# Patient Record
Sex: MALE | Race: WHITE
[De-identification: names, ages, dates, MRNs, and addresses within clinical notes are randomized per-mention and may not be internally consistent; named-entity substitution may affect disease eponyms.]

---

## 2017-10-16 ENCOUNTER — RESULT REVIEW (OUTPATIENT)
Age: 52
End: 2017-10-16

## 2021-11-03 ENCOUNTER — APPOINTMENT (OUTPATIENT)
Dept: OTOLARYNGOLOGY | Facility: CLINIC | Age: 56
End: 2021-11-03
Payer: COMMERCIAL

## 2021-11-03 VITALS
DIASTOLIC BLOOD PRESSURE: 58 MMHG | HEIGHT: 66 IN | SYSTOLIC BLOOD PRESSURE: 116 MMHG | BODY MASS INDEX: 26.52 KG/M2 | HEART RATE: 52 BPM | WEIGHT: 165 LBS

## 2021-11-03 DIAGNOSIS — Z78.9 OTHER SPECIFIED HEALTH STATUS: ICD-10-CM

## 2021-11-03 PROCEDURE — 99204 OFFICE O/P NEW MOD 45 MIN: CPT

## 2021-11-03 RX ORDER — CIPROFLOXACIN 0.5 MG/.25ML
0.2 SOLUTION/ DROPS AURICULAR (OTIC)
Refills: 0 | Status: DISCONTINUED | COMMUNITY

## 2021-11-03 RX ORDER — AMOXICILLIN 875 MG/1
875 TABLET, FILM COATED ORAL
Refills: 0 | Status: DISCONTINUED | COMMUNITY

## 2021-11-03 NOTE — HISTORY OF PRESENT ILLNESS
[de-identified] : Andres Gillette is a 55 yo male who presents for evaluation of ear infection. He states that he began have left otalgia, aural fullness and ringing on 10/28. He went to urgent care and was prescribed amoxicillin and ciprofloxacin drops. He states that the pain improved but the fullness and ringing improved. Overnight on 11/1, while digitally manipulating his ear canal, he noted some bleeding. He denies any preceding otorrhea, vertigo, or facial weakness. He notes that the tinnitus is a constant buzzing, left-sided, nonpulsatile. He denies nasal congestion, rhinorrhea, or postnasal drainage. He denies fevers or chills.

## 2021-11-03 NOTE — REASON FOR VISIT
[Tinnitus] : tinnitus [Initial Evaluation] : an initial evaluation for [FreeTextEntry2] : ear blockage

## 2021-11-03 NOTE — PHYSICAL EXAM
[de-identified] : Bilateral external ears normal appearing. Right EAC wnl. Left EAC with significant circumferential inflammation and edema. Otowick placed. [de-identified] : Right TM intact with no effusion or perforation. Unable to visualize left TM. [Midline] : trachea located in midline position [Normal] : no rashes

## 2021-11-03 NOTE — REVIEW OF SYSTEMS
[Ear Drainage] : ear drainage [Ear Noises] : ear noises [Nose Bleeds] : nose bleeds [Problem Snoring] : problem snoring [Sinus Pain] : sinus pain [Sinus Pressure] : sinus pressure [Throat Dryness] : throat dryness [Throat Itching] : throat itching [Negative] : Heme/Lymph [de-identified] : strawberry birthmark [de-identified] : headache [FreeTextEntry1] : sweating at night

## 2021-11-03 NOTE — ASSESSMENT
[FreeTextEntry1] : Andres Gillette presents with significant left acute otitis externa. Wick was placed today and drops applied. His previous drops did not have a steroid component. Will start ciprodex drops.\par \par - Ciprodex drops - 5 drops twice daily to left ear.\par - Follow up in 5 days for wick removal.

## 2021-11-08 ENCOUNTER — APPOINTMENT (OUTPATIENT)
Dept: OTOLARYNGOLOGY | Facility: CLINIC | Age: 56
End: 2021-11-08
Payer: COMMERCIAL

## 2021-11-08 VITALS
DIASTOLIC BLOOD PRESSURE: 71 MMHG | SYSTOLIC BLOOD PRESSURE: 111 MMHG | HEART RATE: 57 BPM | HEIGHT: 66 IN | BODY MASS INDEX: 26.52 KG/M2 | WEIGHT: 165 LBS

## 2021-11-08 PROCEDURE — 99213 OFFICE O/P EST LOW 20 MIN: CPT

## 2021-11-08 NOTE — PHYSICAL EXAM
[de-identified] : Bilateral external ears normal appearing. Right EAC wnl. Left otowick removed. Improved edema and erythema of EAC. [de-identified] : Right TM intact with no effusion or perforation. Partial visualization of left TM, appears intact with no effusion. [Midline] : trachea located in midline position [Normal] : no rashes

## 2021-11-08 NOTE — ASSESSMENT
[FreeTextEntry1] : Mr. Gillette presents for follow-up of left otitis externa. Otowick removed today. Left external auditory canal erythema and edema improved, but still somewhat present. \par \par - Continue ciprodex drops - 5 drops to left ear BID for an additional 9 days, making it a total of 14 days.\par - Follow up in 2 weeks.

## 2021-11-08 NOTE — HISTORY OF PRESENT ILLNESS
[de-identified] : Andres Gillette is a 55 yo male who presents for evaluation of ear infection. He states that he began have left otalgia, aural fullness and ringing on 10/28. He went to urgent care and was prescribed amoxicillin and ciprofloxacin drops. He states that the pain improved but the fullness and ringing improved. Overnight on 11/1, while digitally manipulating his ear canal, he noted some bleeding. He denies any preceding otorrhea, vertigo, or facial weakness. He notes that the tinnitus is a constant buzzing, left-sided, nonpulsatile. He denies nasal congestion, rhinorrhea, or postnasal drainage. He denies fevers or chills. [FreeTextEntry1] : 11/8/21 - Mr. Gillette presents for follow-up and ear wick removal. He states that the pain has resolved but he continues to have left tinnitus. He denies left otorrhea, vertigo, fevers, or chills. He denies facial weakness or numbness.[id number]

## 2021-11-22 ENCOUNTER — APPOINTMENT (OUTPATIENT)
Dept: OTOLARYNGOLOGY | Facility: CLINIC | Age: 56
End: 2021-11-22
Payer: COMMERCIAL

## 2021-11-22 VITALS
HEIGHT: 66 IN | WEIGHT: 165 LBS | SYSTOLIC BLOOD PRESSURE: 112 MMHG | BODY MASS INDEX: 26.52 KG/M2 | DIASTOLIC BLOOD PRESSURE: 68 MMHG | HEART RATE: 56 BPM

## 2021-11-22 PROCEDURE — 99213 OFFICE O/P EST LOW 20 MIN: CPT

## 2021-11-22 NOTE — HISTORY OF PRESENT ILLNESS
[de-identified] : Andres Gillette is a 57 yo male who presents for evaluation of ear infection. He states that he began have left otalgia, aural fullness and ringing on 10/28. He went to urgent care and was prescribed amoxicillin and ciprofloxacin drops. He states that the pain improved but the fullness and ringing improved. Overnight on 11/1, while digitally manipulating his ear canal, he noted some bleeding. He denies any preceding otorrhea, vertigo, or facial weakness. He notes that the tinnitus is a constant buzzing, left-sided, nonpulsatile. He denies nasal congestion, rhinorrhea, or postnasal drainage. He denies fevers or chills. [FreeTextEntry1] : 11/8/21 - Mr. Gillette presents for follow-up and ear wick removal. He states that the pain has resolved but he continues to have left tinnitus. He denies left otorrhea, vertigo, fevers, or chills. He denies facial weakness or numbness.\par \par 11/22/21 - Patient presents for follow-up. He states that his otalgia has resolved. He denies hearing change and notes that tinnitus has resolved. He denies otorrhea, vertigo, fevers, chills.

## 2021-11-22 NOTE — PHYSICAL EXAM
[de-identified] : Bilateral external ears normal appearing. Right EAC wnl. Resolved edema and erythema of left EAC [Midline] : trachea located in midline position [Normal] : no rashes

## 2021-11-22 NOTE — ASSESSMENT
[FreeTextEntry1] : Andres Gillette presents for follow-up of his left acute otitis externa. He has completed his antibiotic/steroid drop course and his symptoms have resolved. His infection has resolved on exam. His tinnitus is gone and hearing is at baseline so will defer audiogram today.\par \par - Follow up as needed.

## 2022-06-06 ENCOUNTER — FORM ENCOUNTER (OUTPATIENT)
Age: 57
End: 2022-06-06

## 2022-07-14 ENCOUNTER — FORM ENCOUNTER (OUTPATIENT)
Age: 57
End: 2022-07-14

## 2022-07-15 ENCOUNTER — NON-APPOINTMENT (OUTPATIENT)
Age: 57
End: 2022-07-15

## 2022-07-20 ENCOUNTER — NON-APPOINTMENT (OUTPATIENT)
Age: 57
End: 2022-07-20

## 2022-11-07 ENCOUNTER — OFFICE (OUTPATIENT)
Dept: URBAN - METROPOLITAN AREA CLINIC 116 | Facility: CLINIC | Age: 57
Setting detail: OPHTHALMOLOGY
End: 2022-11-07
Payer: COMMERCIAL

## 2022-11-07 DIAGNOSIS — H17.9: ICD-10-CM

## 2022-11-07 PROBLEM — H52.4 PRESBYOPIA: Status: ACTIVE | Noted: 2022-11-07

## 2022-11-07 PROCEDURE — 92014 COMPRE OPH EXAM EST PT 1/>: CPT | Performed by: OPTOMETRIST

## 2022-11-07 ASSESSMENT — LID EXAM ASSESSMENTS
OS_BLEPHARITIS: LLL 1+
OD_BLEPHARITIS: RLL T 1+
OS_COMMENTS: INSPISSATED GLANDS
OD_COMMENTS: INSPISSATED GLANDS

## 2022-11-07 ASSESSMENT — KERATOMETRY
OS_AXISANGLE_DEGREES: 120
OS_K1POWER_DIOPTERS: 43.25
OD_K2POWER_DIOPTERS: 44.50
OS_K2POWER_DIOPTERS: 43.75
OD_K1POWER_DIOPTERS: 43.25
OD_AXISANGLE_DEGREES: 130

## 2022-11-07 ASSESSMENT — AXIALLENGTH_DERIVED
OS_AL: 23.5919
OS_AL: 23.4949
OD_AL: 24.6701

## 2022-11-07 ASSESSMENT — CONFRONTATIONAL VISUAL FIELD TEST (CVF)
OD_FINDINGS: FULL
OS_FINDINGS: FULL

## 2022-11-07 ASSESSMENT — REFRACTION_MANIFEST
OS_ADD: +1.50
OD_SPHERE: PLANO
OS_VA1: 20/20
OD_ADD: +1.50
OS_AXIS: 080
OS_CYLINDER: -0.50
OS_SPHERE: +0.50
OD_VA1: 20/30-2

## 2022-11-07 ASSESSMENT — REFRACTION_CURRENTRX
OD_OVR_VA: 20/
OS_OVR_VA: 20/
OS_SPHERE: +1.25
OD_SPHERE: +1.25

## 2022-11-07 ASSESSMENT — VISUAL ACUITY
OS_BCVA: 20/30-2
OD_BCVA: 20/30

## 2022-11-07 ASSESSMENT — SPHEQUIV_DERIVED
OS_SPHEQUIV: 0.25
OS_SPHEQUIV: 0
OD_SPHEQUIV: -3

## 2022-11-07 ASSESSMENT — TONOMETRY
OS_IOP_MMHG: 18
OD_IOP_MMHG: 18

## 2022-11-07 ASSESSMENT — CORNEAL DYSTROPHY - POSTERIOR: OD_POSTERIORDYSTROPHY: 2+ GUTTATA

## 2022-11-07 ASSESSMENT — REFRACTION_AUTOREFRACTION
OD_AXIS: 095
OS_CYLINDER: -0.50
OD_CYLINDER: -3.00
OS_AXIS: 085
OD_SPHERE: -1.50
OS_SPHERE: +0.25

## 2023-03-09 ENCOUNTER — APPOINTMENT (OUTPATIENT)
Dept: GASTROENTEROLOGY | Facility: CLINIC | Age: 58
End: 2023-03-09
Payer: COMMERCIAL

## 2023-03-09 VITALS
HEIGHT: 66 IN | DIASTOLIC BLOOD PRESSURE: 95 MMHG | BODY MASS INDEX: 27.32 KG/M2 | SYSTOLIC BLOOD PRESSURE: 149 MMHG | WEIGHT: 170 LBS | HEART RATE: 65 BPM

## 2023-03-09 PROCEDURE — 99203 OFFICE O/P NEW LOW 30 MIN: CPT

## 2023-03-09 NOTE — PHYSICAL EXAM
[Patient Refused] : rectal exam was refused by the patient [Normal] : oriented to person, place, and time

## 2023-03-09 NOTE — ASSESSMENT
[FreeTextEntry1] : 57 M presenting for screening colonoscopy evaluation. Previous colonoscopy in 2017 showed polyps. Will plan for colonoscopy. Discussed with patient prep, procedure, and risks such as missed lesions/polyps, bleeding, and perforation of the bowel. Verbalized understanding. Prep sent to pharmacy. Discussed with Dr. Mckeon.

## 2023-04-06 ENCOUNTER — OFFICE (OUTPATIENT)
Dept: URBAN - METROPOLITAN AREA CLINIC 70 | Facility: CLINIC | Age: 58
Setting detail: OPHTHALMOLOGY
End: 2023-04-06
Payer: MEDICAID

## 2023-04-06 DIAGNOSIS — H17.9: ICD-10-CM

## 2023-04-06 PROCEDURE — 92012 INTRM OPH EXAM EST PATIENT: CPT | Performed by: OPHTHALMOLOGY

## 2023-04-06 ASSESSMENT — REFRACTION_AUTOREFRACTION
OD_CYLINDER: -4.00
OD_AXIS: 039
OD_SPHERE: +1.25
OS_AXIS: 077
OS_CYLINDER: -0.50
OS_SPHERE: +0.25

## 2023-04-06 ASSESSMENT — REFRACTION_MANIFEST
OS_AXIS: 080
OS_SPHERE: +0.50
OD_SPHERE: PLANO
OD_ADD: +1.50
OS_VA1: 20/20
OD_VA1: 20/30-2
OS_CYLINDER: -0.50
OS_ADD: +1.50

## 2023-04-06 ASSESSMENT — LID EXAM ASSESSMENTS
OD_COMMENTS: INSPISSATED GLANDS
OS_BLEPHARITIS: LLL 1+
OD_BLEPHARITIS: RLL T 1+
OS_COMMENTS: INSPISSATED GLANDS

## 2023-04-06 ASSESSMENT — AXIALLENGTH_DERIVED
OS_AL: 23.2694
OS_AL: 23.3645
OD_AL: 23.4715

## 2023-04-06 ASSESSMENT — SPHEQUIV_DERIVED
OD_SPHEQUIV: -0.75
OS_SPHEQUIV: 0
OS_SPHEQUIV: 0.25

## 2023-04-06 ASSESSMENT — KERATOMETRY
OS_K2POWER_DIOPTERS: 44.25
OD_K2POWER_DIOPTERS: 45.75
OD_K1POWER_DIOPTERS: 43.50
OS_AXISANGLE_DEGREES: 087
OS_K1POWER_DIOPTERS: 44.00
OD_AXISANGLE_DEGREES: 119

## 2023-04-06 ASSESSMENT — CONFRONTATIONAL VISUAL FIELD TEST (CVF)
OS_FINDINGS: FULL
OD_FINDINGS: FULL

## 2023-04-06 ASSESSMENT — VISUAL ACUITY
OD_BCVA: 20/30
OS_BCVA: 20/30-2

## 2023-04-06 ASSESSMENT — REFRACTION_CURRENTRX
OD_OVR_VA: 20/
OS_OVR_VA: 20/
OS_SPHERE: +1.25
OD_SPHERE: +1.25

## 2023-04-06 ASSESSMENT — CORNEAL DYSTROPHY - POSTERIOR: OD_POSTERIORDYSTROPHY: 2+ GUTTATA

## 2023-08-09 ENCOUNTER — APPOINTMENT (OUTPATIENT)
Dept: FAMILY MEDICINE | Facility: CLINIC | Age: 58
End: 2023-08-09
Payer: COMMERCIAL

## 2023-08-09 VITALS
WEIGHT: 176 LBS | DIASTOLIC BLOOD PRESSURE: 78 MMHG | BODY MASS INDEX: 28.28 KG/M2 | SYSTOLIC BLOOD PRESSURE: 128 MMHG | OXYGEN SATURATION: 99 % | HEART RATE: 67 BPM | HEIGHT: 66 IN

## 2023-08-09 DIAGNOSIS — Z23 ENCOUNTER FOR IMMUNIZATION: ICD-10-CM

## 2023-08-09 DIAGNOSIS — H60.502 UNSPECIFIED ACUTE NONINFECTIVE OTITIS EXTERNA, LEFT EAR: ICD-10-CM

## 2023-08-09 DIAGNOSIS — Z13.21 ENCOUNTER FOR SCREENING FOR NUTRITIONAL DISORDER: ICD-10-CM

## 2023-08-09 DIAGNOSIS — Z00.00 ENCOUNTER FOR GENERAL ADULT MEDICAL EXAMINATION W/OUT ABNORMAL FINDINGS: ICD-10-CM

## 2023-08-09 DIAGNOSIS — Z13.1 ENCOUNTER FOR SCREENING FOR DIABETES MELLITUS: ICD-10-CM

## 2023-08-09 DIAGNOSIS — Z78.9 OTHER SPECIFIED HEALTH STATUS: ICD-10-CM

## 2023-08-09 DIAGNOSIS — E78.5 HYPERLIPIDEMIA, UNSPECIFIED: ICD-10-CM

## 2023-08-09 DIAGNOSIS — Z12.11 ENCOUNTER FOR SCREENING FOR MALIGNANT NEOPLASM OF COLON: ICD-10-CM

## 2023-08-09 DIAGNOSIS — Z13.29 ENCOUNTER FOR SCREENING FOR OTHER SUSPECTED ENDOCRINE DISORDER: ICD-10-CM

## 2023-08-09 DIAGNOSIS — R97.20 ELEVATED PROSTATE, SPECIFIC ANTIGEN [PSA]: ICD-10-CM

## 2023-08-09 PROCEDURE — G0444 DEPRESSION SCREEN ANNUAL: CPT | Mod: 59

## 2023-08-09 PROCEDURE — 99386 PREV VISIT NEW AGE 40-64: CPT | Mod: 25

## 2023-08-09 RX ORDER — ROSUVASTATIN CALCIUM 10 MG/1
10 TABLET, FILM COATED ORAL
Refills: 0 | Status: DISCONTINUED | COMMUNITY
End: 2023-08-09

## 2023-08-09 RX ORDER — CHOLECALCIFEROL (VITAMIN D3) 25 MCG
TABLET ORAL
Refills: 0 | Status: DISCONTINUED | COMMUNITY
End: 2023-08-09

## 2023-08-09 RX ORDER — CIPROFLOXACIN AND DEXAMETHASONE 3; 1 MG/ML; MG/ML
0.3-0.1 SUSPENSION/ DROPS AURICULAR (OTIC) TWICE DAILY
Qty: 1 | Refills: 1 | Status: DISCONTINUED | COMMUNITY
Start: 2021-11-03 | End: 2023-08-09

## 2023-08-09 RX ORDER — SODIUM PICOSULFATE, MAGNESIUM OXIDE, AND ANHYDROUS CITRIC ACID 10; 3.5; 12 MG/160ML; G/160ML; G/160ML
10-3.5-12 MG-GM LIQUID ORAL
Qty: 2 | Refills: 0 | Status: DISCONTINUED | COMMUNITY
Start: 2023-03-09 | End: 2023-08-09

## 2023-08-09 NOTE — HEALTH RISK ASSESSMENT
[Yes] : Yes [2 - 3 times a week (3 pts)] : 2 - 3  times a week (3 points) [1 or 2 (0 pts)] : 1 or 2 (0 points) [0] : 2) Feeling down, depressed, or hopeless: Not at all (0) [Very Good] : ~his/her~  mood as very good [Never (0 pts)] : Never (0 points) [No] : In the past 12 months have you used drugs other than those required for medical reasons? No [No falls in past year] : Patient reported no falls in the past year [PHQ-2 Negative - No further assessment needed] : PHQ-2 Negative - No further assessment needed [Audit-CScore] : 3 [de-identified] : needs improvement [de-identified] : healthy [[id number]] : 0 [Patient reported colonoscopy was abnormal] : Patient reported colonoscopy was abnormal [HIV test declined] : HIV test declined [Hepatitis C test declined] : Hepatitis C test declined [With Family] : lives with family [Employed] : employed [] :  [Sexually Active] : sexually active [High Risk Behavior] : no high risk behavior [Feels Safe at Home] : Feels safe at home [Fully functional (bathing, dressing, toileting, transferring, walking, feeding)] : Fully functional (bathing, dressing, toileting, transferring, walking, feeding) [Fully functional (using the telephone, shopping, preparing meals, housekeeping, doing laundry, using] : Fully functional and needs no help or supervision to perform IADLs (using the telephone, shopping, preparing meals, housekeeping, doing laundry, using transportation, managing medications and managing finances) [Reports changes in hearing] : Reports no changes in hearing [Reports changes in vision] : Reports no changes in vision [Reports changes in dental health] : Reports no changes in dental health [ColonoscopyDate] : 01/20 [ColonoscopyComments] : colonic polyps, repeat due September 2023 [Never] : Never

## 2023-08-09 NOTE — HISTORY OF PRESENT ILLNESS
[FreeTextEntry1] : NP-CPE [de-identified] : 56 yo male presents for annual physical. Reports feeling well. Denies fever, chills, cp, palpitations, sob, nvcd.

## 2023-08-09 NOTE — ASSESSMENT
[FreeTextEntry1] : Annual physical: f/u routine labwork Elevated PSA: hx of elevated psa, 4.1, occurred in 2019, recommend f/u with urology Colon CA screening: f/u GI for colonoscopy BMI 28: Recommend low fat diet, wt loss, exercise, nutritional counseling provided HLD: was previously on statin therapy, Recommend low fat diet, wt loss, exercise, nutritional counseling provided, f/u lipid panel RTC 3 wks

## 2023-08-14 DIAGNOSIS — E55.9 VITAMIN D DEFICIENCY, UNSPECIFIED: ICD-10-CM

## 2023-08-14 LAB
25(OH)D3 SERPL-MCNC: 27.8 NG/ML
ALBUMIN SERPL ELPH-MCNC: 4.5 G/DL
ALP BLD-CCNC: 57 U/L
ALT SERPL-CCNC: 15 U/L
ANION GAP SERPL CALC-SCNC: 11 MMOL/L
APPEARANCE: CLEAR
AST SERPL-CCNC: 20 U/L
BACTERIA: NEGATIVE /HPF
BILIRUB SERPL-MCNC: 0.5 MG/DL
BILIRUBIN URINE: NEGATIVE
BLOOD URINE: NEGATIVE
BUN SERPL-MCNC: 17 MG/DL
CALCIUM SERPL-MCNC: 9.4 MG/DL
CAST: 0 /LPF
CHLORIDE SERPL-SCNC: 105 MMOL/L
CHOLEST SERPL-MCNC: 222 MG/DL
CO2 SERPL-SCNC: 23 MMOL/L
COLOR: YELLOW
CREAT SERPL-MCNC: 0.98 MG/DL
EGFR: 89 ML/MIN/1.73M2
EPITHELIAL CELLS: 0 /HPF
ESTIMATED AVERAGE GLUCOSE: 108 MG/DL
GLUCOSE QUALITATIVE U: NEGATIVE MG/DL
GLUCOSE SERPL-MCNC: 108 MG/DL
HBA1C MFR BLD HPLC: 5.4 %
HDLC SERPL-MCNC: 53 MG/DL
KETONES URINE: NEGATIVE MG/DL
LDLC SERPL CALC-MCNC: 147 MG/DL
LEUKOCYTE ESTERASE URINE: NEGATIVE
MICROSCOPIC-UA: NORMAL
NITRITE URINE: NEGATIVE
NONHDLC SERPL-MCNC: 169 MG/DL
PH URINE: 6
POTASSIUM SERPL-SCNC: 4.2 MMOL/L
PROT SERPL-MCNC: 7.3 G/DL
PROTEIN URINE: NEGATIVE MG/DL
PSA FREE FLD-MCNC: 11 %
PSA FREE SERPL-MCNC: 0.23 NG/ML
PSA SERPL-MCNC: 2.14 NG/ML
RED BLOOD CELLS URINE: 0 /HPF
SODIUM SERPL-SCNC: 140 MMOL/L
SPECIFIC GRAVITY URINE: 1.01
TRIGL SERPL-MCNC: 122 MG/DL
TSH SERPL-ACNC: 2.7 UIU/ML
UROBILINOGEN URINE: 0.2 MG/DL
WHITE BLOOD CELLS URINE: 0 /HPF

## 2023-08-14 RX ORDER — MULTIVIT-MIN/FOLIC/VIT K/LYCOP 400-300MCG
50 MCG TABLET ORAL
Qty: 30 | Refills: 2 | Status: ACTIVE | COMMUNITY
Start: 2023-08-14

## 2023-08-28 ENCOUNTER — APPOINTMENT (OUTPATIENT)
Dept: UROLOGY | Facility: CLINIC | Age: 58
End: 2023-08-28
Payer: COMMERCIAL

## 2023-08-28 ENCOUNTER — NON-APPOINTMENT (OUTPATIENT)
Age: 58
End: 2023-08-28

## 2023-08-28 DIAGNOSIS — B35.6 TINEA CRURIS: ICD-10-CM

## 2023-08-28 PROCEDURE — 99203 OFFICE O/P NEW LOW 30 MIN: CPT

## 2023-08-28 RX ORDER — NYSTATIN 100000 1/G
100000 POWDER TOPICAL AS DIRECTED
Qty: 1 | Refills: 0 | Status: ACTIVE | COMMUNITY
Start: 2023-08-28 | End: 1900-01-01

## 2023-08-28 NOTE — ASSESSMENT
[FreeTextEntry1] : PSA wnl Discussed with patient that PSA is imprecise test. PSA can be elevated due to benign prostate enlargement, prostate stimulation, sexual activity, urinary tract infection, prostatitis, as well as prostate cancer.  Jock itch: apply nystatin powder daily

## 2023-08-28 NOTE — PHYSICAL EXAM
[General Appearance - Well Developed] : well developed [General Appearance - Well Nourished] : well nourished [Normal Appearance] : normal appearance [Well Groomed] : well groomed [General Appearance - In No Acute Distress] : no acute distress [Edema] : no peripheral edema [] : no respiratory distress [Respiration, Rhythm And Depth] : normal respiratory rhythm and effort [Exaggerated Use Of Accessory Muscles For Inspiration] : no accessory muscle use [Urethral Meatus] : meatus normal [Penis Abnormality] : normal circumcised penis [Scrotum] : the scrotum was normal [Testes Tenderness] : no tenderness of the testes [Testes Mass (___cm)] : there were no testicular masses [FreeTextEntry1] : declined prostate exam [Normal Station and Gait] : the gait and station were normal for the patient's age [No Focal Deficits] : no focal deficits [Oriented To Time, Place, And Person] : oriented to person, place, and time [Affect] : the affect was normal [Mood] : the mood was normal [Not Anxious] : not anxious

## 2023-09-14 VITALS
WEIGHT: 176 LBS | HEART RATE: 59 BPM | SYSTOLIC BLOOD PRESSURE: 110 MMHG | RESPIRATION RATE: 16 BRPM | DIASTOLIC BLOOD PRESSURE: 73 MMHG | HEIGHT: 66 IN | BODY MASS INDEX: 28.28 KG/M2

## 2023-09-18 ENCOUNTER — APPOINTMENT (OUTPATIENT)
Dept: GASTROENTEROLOGY | Facility: AMBULATORY MEDICAL SERVICES | Age: 58
End: 2023-09-18
Payer: COMMERCIAL

## 2023-09-18 PROCEDURE — 45378 DIAGNOSTIC COLONOSCOPY: CPT | Mod: 33

## 2023-11-10 ENCOUNTER — RX RENEWAL (OUTPATIENT)
Age: 58
End: 2023-11-10

## 2023-12-16 ENCOUNTER — OFFICE (OUTPATIENT)
Dept: URBAN - METROPOLITAN AREA CLINIC 112 | Facility: CLINIC | Age: 58
Setting detail: OPHTHALMOLOGY
End: 2023-12-16
Payer: COMMERCIAL

## 2023-12-16 DIAGNOSIS — B00.52: ICD-10-CM

## 2023-12-16 DIAGNOSIS — H17.9: ICD-10-CM

## 2023-12-16 DIAGNOSIS — H25.13: ICD-10-CM

## 2023-12-16 DIAGNOSIS — H16.221: ICD-10-CM

## 2023-12-16 DIAGNOSIS — H16.041: ICD-10-CM

## 2023-12-16 PROCEDURE — 99214 OFFICE O/P EST MOD 30 MIN: CPT | Performed by: OPHTHALMOLOGY

## 2023-12-16 PROCEDURE — 92285 EXTERNAL OCULAR PHOTOGRAPHY: CPT | Performed by: OPHTHALMOLOGY

## 2023-12-16 ASSESSMENT — REFRACTION_MANIFEST
OS_VA1: 20/20
OD_VA1: 20/30-2
OS_AXIS: 080
OD_SPHERE: PLANO
OS_CYLINDER: -0.50
OS_ADD: +1.50
OS_SPHERE: +0.50
OD_ADD: +1.50

## 2023-12-16 ASSESSMENT — REFRACTION_AUTOREFRACTION
OS_SPHERE: +0.25
OS_AXIS: 059
OD_SPHERE: UTP
OS_CYLINDER: -0.25

## 2023-12-16 ASSESSMENT — CORNEAL DYSTROPHY - POSTERIOR: OD_POSTERIORDYSTROPHY: 2+ GUTTATA

## 2023-12-16 ASSESSMENT — LID EXAM ASSESSMENTS
OD_BLEPHARITIS: RLL T 1+
OD_COMMENTS: INSPISSATED GLANDS
OS_COMMENTS: INSPISSATED GLANDS
OS_BLEPHARITIS: LLL 1+

## 2023-12-16 ASSESSMENT — REFRACTION_CURRENTRX
OS_SPHERE: +1.25
OS_OVR_VA: 20/
OD_SPHERE: +1.25
OD_OVR_VA: 20/

## 2023-12-16 ASSESSMENT — SUPERFICIAL PUNCTATE KERATITIS (SPK): OD_SPK: 3+

## 2023-12-16 ASSESSMENT — CORNEAL EDEMA CLINICAL DESCRIPTION: OD_CORNEALEDEMA: 2+

## 2023-12-16 ASSESSMENT — SPHEQUIV_DERIVED
OS_SPHEQUIV: 0.25
OS_SPHEQUIV: 0.125

## 2023-12-18 ENCOUNTER — RX ONLY (RX ONLY)
Age: 58
End: 2023-12-18

## 2023-12-18 ENCOUNTER — OFFICE (OUTPATIENT)
Dept: URBAN - METROPOLITAN AREA CLINIC 104 | Facility: CLINIC | Age: 58
Setting detail: OPHTHALMOLOGY
End: 2023-12-18
Payer: COMMERCIAL

## 2023-12-18 DIAGNOSIS — H17.9: ICD-10-CM

## 2023-12-18 DIAGNOSIS — B00.52: ICD-10-CM

## 2023-12-18 PROCEDURE — 99213 OFFICE O/P EST LOW 20 MIN: CPT | Performed by: OPHTHALMOLOGY

## 2023-12-18 ASSESSMENT — LID EXAM ASSESSMENTS
OD_BLEPHARITIS: RLL T 1+
OS_BLEPHARITIS: LLL 1+
OS_COMMENTS: INSPISSATED GLANDS
OD_COMMENTS: INSPISSATED GLANDS

## 2023-12-18 ASSESSMENT — REFRACTION_CURRENTRX
OD_OVR_VA: 20/
OS_OVR_VA: 20/
OS_SPHERE: +1.25
OD_SPHERE: +1.25

## 2023-12-18 ASSESSMENT — REFRACTION_AUTOREFRACTION
OS_CYLINDER: -0.25
OD_SPHERE: UTP
OS_AXIS: 059
OS_SPHERE: +0.25

## 2023-12-18 ASSESSMENT — REFRACTION_MANIFEST
OS_ADD: +1.50
OS_CYLINDER: -0.50
OD_VA1: 20/30-2
OD_SPHERE: PLANO
OD_ADD: +1.50
OS_AXIS: 080
OS_VA1: 20/20
OS_SPHERE: +0.50

## 2023-12-18 ASSESSMENT — SUPERFICIAL PUNCTATE KERATITIS (SPK): OD_SPK: 3+

## 2023-12-18 ASSESSMENT — CONFRONTATIONAL VISUAL FIELD TEST (CVF)
OS_FINDINGS: FULL
OD_FINDINGS: FULL

## 2023-12-18 ASSESSMENT — SPHEQUIV_DERIVED
OS_SPHEQUIV: 0.125
OS_SPHEQUIV: 0.25

## 2023-12-18 ASSESSMENT — CORNEAL DYSTROPHY - POSTERIOR: OD_POSTERIORDYSTROPHY: 2+ GUTTATA

## 2023-12-18 ASSESSMENT — CORNEAL EDEMA CLINICAL DESCRIPTION: OD_CORNEALEDEMA: T 1+

## 2023-12-28 ENCOUNTER — OFFICE (OUTPATIENT)
Dept: URBAN - METROPOLITAN AREA CLINIC 104 | Facility: CLINIC | Age: 58
Setting detail: OPHTHALMOLOGY
End: 2023-12-28
Payer: COMMERCIAL

## 2023-12-28 DIAGNOSIS — B00.52: ICD-10-CM

## 2023-12-28 DIAGNOSIS — H17.9: ICD-10-CM

## 2023-12-28 PROBLEM — H16.221 DRY EYE SYNDROME K SICCA;  , RIGHT EYE: Status: ACTIVE | Noted: 2023-12-16

## 2023-12-28 PROCEDURE — 92012 INTRM OPH EXAM EST PATIENT: CPT | Performed by: OPHTHALMOLOGY

## 2023-12-28 ASSESSMENT — REFRACTION_AUTOREFRACTION
OS_AXIS: 103
OS_CYLINDER: -0.75
OD_SPHERE: PLANO
OD_CYLINDER: -5.50
OD_AXIS: 035
OS_SPHERE: +0.75

## 2023-12-28 ASSESSMENT — CONFRONTATIONAL VISUAL FIELD TEST (CVF)
OD_FINDINGS: FULL
OS_FINDINGS: FULL

## 2023-12-28 ASSESSMENT — SPHEQUIV_DERIVED: OS_SPHEQUIV: 0.375

## 2023-12-28 ASSESSMENT — REFRACTION_CURRENTRX
OD_OVR_VA: 20/
OS_OVR_VA: 20/

## 2023-12-28 ASSESSMENT — SUPERFICIAL PUNCTATE KERATITIS (SPK): OD_SPK: 3+

## 2023-12-28 ASSESSMENT — CORNEAL DYSTROPHY - POSTERIOR: OD_POSTERIORDYSTROPHY: 2+ GUTTATA

## 2023-12-28 ASSESSMENT — CORNEAL EDEMA CLINICAL DESCRIPTION: OD_CORNEALEDEMA: T 1+

## 2024-01-11 ENCOUNTER — RX ONLY (RX ONLY)
Age: 59
End: 2024-01-11

## 2024-01-11 ENCOUNTER — OFFICE (OUTPATIENT)
Dept: URBAN - METROPOLITAN AREA CLINIC 104 | Facility: CLINIC | Age: 59
Setting detail: OPHTHALMOLOGY
End: 2024-01-11
Payer: COMMERCIAL

## 2024-01-11 DIAGNOSIS — B00.52: ICD-10-CM

## 2024-01-11 DIAGNOSIS — H17.9: ICD-10-CM

## 2024-01-11 PROCEDURE — 99213 OFFICE O/P EST LOW 20 MIN: CPT | Performed by: OPHTHALMOLOGY

## 2024-01-11 ASSESSMENT — SPHEQUIV_DERIVED
OS_SPHEQUIV: 0.25
OD_SPHEQUIV: -0.125

## 2024-01-11 ASSESSMENT — CORNEAL DYSTROPHY - POSTERIOR: OD_POSTERIORDYSTROPHY: 2+ GUTTATA

## 2024-01-11 ASSESSMENT — LID EXAM ASSESSMENTS
OS_COMMENTS: INSPISSATED GLANDS
OD_COMMENTS: INSPISSATED GLANDS
OD_BLEPHARITIS: RLL T 1+
OS_BLEPHARITIS: LLL 1+

## 2024-01-11 ASSESSMENT — REFRACTION_AUTOREFRACTION
OS_AXIS: 091
OD_SPHERE: +2.00
OS_SPHERE: +0.75
OD_AXIS: 056
OS_CYLINDER: -1.00
OD_CYLINDER: -4.25

## 2024-01-11 ASSESSMENT — SUPERFICIAL PUNCTATE KERATITIS (SPK): OD_SPK: 3+

## 2024-01-11 ASSESSMENT — CONFRONTATIONAL VISUAL FIELD TEST (CVF)
OD_FINDINGS: FULL
OS_FINDINGS: FULL

## 2024-01-11 ASSESSMENT — CORNEAL EDEMA CLINICAL DESCRIPTION: OD_CORNEALEDEMA: ABSENT

## 2024-01-29 ENCOUNTER — RX RENEWAL (OUTPATIENT)
Age: 59
End: 2024-01-29

## 2024-02-22 ENCOUNTER — OFFICE (OUTPATIENT)
Dept: URBAN - METROPOLITAN AREA CLINIC 104 | Facility: CLINIC | Age: 59
Setting detail: OPHTHALMOLOGY
End: 2024-02-22
Payer: COMMERCIAL

## 2024-02-22 DIAGNOSIS — H17.9: ICD-10-CM

## 2024-02-22 DIAGNOSIS — B00.52: ICD-10-CM

## 2024-02-22 PROCEDURE — 99213 OFFICE O/P EST LOW 20 MIN: CPT | Performed by: OPHTHALMOLOGY

## 2024-02-22 ASSESSMENT — REFRACTION_MANIFEST
OD_CYLINDER: -3.75
OD_AXIS: 050
OD_VA1: 20/30
OD_SPHERE: +1.75
OS_CYLINDER: -0.50
OS_AXIS: 090
OS_VA1: 20/NI
OS_SPHERE: +0.50

## 2024-02-22 ASSESSMENT — CONFRONTATIONAL VISUAL FIELD TEST (CVF)
OS_FINDINGS: FULL
OD_FINDINGS: FULL

## 2024-02-22 ASSESSMENT — SPHEQUIV_DERIVED
OS_SPHEQUIV: 0.25
OS_SPHEQUIV: 0.25
OD_SPHEQUIV: -0.125
OD_SPHEQUIV: -0.125

## 2024-02-22 ASSESSMENT — REFRACTION_AUTOREFRACTION
OD_CYLINDER: -3.25
OD_SPHERE: +1.50
OD_AXIS: 052
OS_SPHERE: +0.50
OS_AXIS: 092
OS_CYLINDER: -0.50

## 2024-02-22 ASSESSMENT — LID EXAM ASSESSMENTS
OS_COMMENTS: INSPISSATED GLANDS
OD_BLEPHARITIS: RLL T 1+
OS_BLEPHARITIS: LLL 1+
OD_COMMENTS: INSPISSATED GLANDS

## 2024-02-22 ASSESSMENT — CORNEAL EDEMA CLINICAL DESCRIPTION: OD_CORNEALEDEMA: ABSENT

## 2024-02-22 ASSESSMENT — CORNEAL DYSTROPHY - POSTERIOR: OD_POSTERIORDYSTROPHY: 2+ GUTTATA

## 2024-02-22 ASSESSMENT — SUPERFICIAL PUNCTATE KERATITIS (SPK): OD_SPK: 3+

## 2024-04-29 ENCOUNTER — OFFICE (OUTPATIENT)
Dept: URBAN - METROPOLITAN AREA CLINIC 104 | Facility: CLINIC | Age: 59
Setting detail: OPHTHALMOLOGY
End: 2024-04-29
Payer: COMMERCIAL

## 2024-04-29 DIAGNOSIS — H17.9: ICD-10-CM

## 2024-04-29 PROCEDURE — 99213 OFFICE O/P EST LOW 20 MIN: CPT | Performed by: OPHTHALMOLOGY

## 2024-04-29 ASSESSMENT — LID EXAM ASSESSMENTS
OD_COMMENTS: INSPISSATED GLANDS
OS_COMMENTS: INSPISSATED GLANDS
OD_BLEPHARITIS: RLL T 1+
OS_BLEPHARITIS: LLL 1+

## 2024-05-09 ENCOUNTER — RX RENEWAL (OUTPATIENT)
Age: 59
End: 2024-05-09

## 2024-05-09 RX ORDER — ROSUVASTATIN CALCIUM 10 MG/1
10 TABLET, FILM COATED ORAL
Qty: 90 | Refills: 0 | Status: ACTIVE | COMMUNITY
Start: 2023-08-09 | End: 1900-01-01

## 2024-06-04 ENCOUNTER — RX ONLY (RX ONLY)
Age: 59
End: 2024-06-04

## 2024-06-04 ENCOUNTER — OFFICE (OUTPATIENT)
Dept: URBAN - METROPOLITAN AREA CLINIC 70 | Facility: CLINIC | Age: 59
Setting detail: OPHTHALMOLOGY
End: 2024-06-04
Payer: COMMERCIAL

## 2024-06-04 DIAGNOSIS — H04.321: ICD-10-CM

## 2024-06-04 DIAGNOSIS — H17.9: ICD-10-CM

## 2024-06-04 PROCEDURE — 92012 INTRM OPH EXAM EST PATIENT: CPT | Performed by: OPHTHALMOLOGY

## 2024-06-04 ASSESSMENT — LID EXAM ASSESSMENTS
OS_BLEPHARITIS: LLL 1+
OD_COMMENTS: INSPISSATED GLANDS
OD_BLEPHARITIS: RLL T 1+
OS_COMMENTS: INSPISSATED GLANDS

## 2024-06-04 ASSESSMENT — CONFRONTATIONAL VISUAL FIELD TEST (CVF)
OD_FINDINGS: FULL
OS_FINDINGS: FULL

## 2024-06-11 ENCOUNTER — RX ONLY (RX ONLY)
Age: 59
End: 2024-06-11

## 2024-06-11 ENCOUNTER — OFFICE (OUTPATIENT)
Dept: URBAN - METROPOLITAN AREA CLINIC 70 | Facility: CLINIC | Age: 59
Setting detail: OPHTHALMOLOGY
End: 2024-06-11
Payer: COMMERCIAL

## 2024-06-11 DIAGNOSIS — H04.211: ICD-10-CM

## 2024-06-11 DIAGNOSIS — H04.321: ICD-10-CM

## 2024-06-11 PROCEDURE — 68840 EXPLORE/IRRIGATE TEAR DUCTS: CPT | Mod: RT | Performed by: OPHTHALMOLOGY

## 2024-06-11 PROCEDURE — 92012 INTRM OPH EXAM EST PATIENT: CPT | Mod: 25 | Performed by: OPHTHALMOLOGY

## 2024-06-11 ASSESSMENT — LID EXAM ASSESSMENTS
OD_COMMENTS: INSPISSATED GLANDS
OD_BLEPHARITIS: RLL T 1+
OS_BLEPHARITIS: LLL 1+
OS_COMMENTS: INSPISSATED GLANDS

## 2024-06-11 ASSESSMENT — CONFRONTATIONAL VISUAL FIELD TEST (CVF)
OD_FINDINGS: FULL
OS_FINDINGS: FULL

## 2024-06-18 ENCOUNTER — OFFICE (OUTPATIENT)
Dept: URBAN - METROPOLITAN AREA CLINIC 70 | Facility: CLINIC | Age: 59
Setting detail: OPHTHALMOLOGY
End: 2024-06-18
Payer: COMMERCIAL

## 2024-06-18 DIAGNOSIS — H17.9: ICD-10-CM

## 2024-06-18 PROBLEM — H04.211 EPIPHORA-DUE TO EXCESS LACRIMATION; RIGHT EYE: Status: RESOLVED | Noted: 2024-06-11 | Resolved: 2024-06-18

## 2024-06-18 PROBLEM — H04.321 DACRYOCYSTITIS, ACUTE; RIGHT EYE: Status: RESOLVED | Noted: 2024-06-04 | Resolved: 2024-06-18

## 2024-06-18 PROCEDURE — DEFER/DELA DEFER/DELAY 30 DAY: Performed by: OPHTHALMOLOGY

## 2024-06-18 PROCEDURE — 92012 INTRM OPH EXAM EST PATIENT: CPT | Performed by: OPHTHALMOLOGY

## 2024-06-18 ASSESSMENT — LID EXAM ASSESSMENTS
OS_COMMENTS: INSPISSATED GLANDS
OD_BLEPHARITIS: RLL T 1+
OD_COMMENTS: INSPISSATED GLANDS
OS_BLEPHARITIS: LLL 1+

## 2024-06-18 ASSESSMENT — CONFRONTATIONAL VISUAL FIELD TEST (CVF)
OS_FINDINGS: FULL
OD_FINDINGS: FULL

## 2024-08-05 ENCOUNTER — RX RENEWAL (OUTPATIENT)
Age: 59
End: 2024-08-05

## 2024-08-14 ENCOUNTER — RX RENEWAL (OUTPATIENT)
Age: 59
End: 2024-08-14

## 2024-10-15 ENCOUNTER — OFFICE (OUTPATIENT)
Dept: URBAN - METROPOLITAN AREA CLINIC 70 | Facility: CLINIC | Age: 59
Setting detail: OPHTHALMOLOGY
End: 2024-10-15
Payer: COMMERCIAL

## 2024-10-15 DIAGNOSIS — H17.9: ICD-10-CM

## 2024-10-15 PROCEDURE — 92012 INTRM OPH EXAM EST PATIENT: CPT | Performed by: OPHTHALMOLOGY

## 2024-10-15 ASSESSMENT — SUPERFICIAL PUNCTATE KERATITIS (SPK): OD_SPK: 3+

## 2024-10-15 ASSESSMENT — LID EXAM ASSESSMENTS
OS_COMMENTS: INSPISSATED GLANDS
OS_BLEPHARITIS: LLL 1+
OD_COMMENTS: INSPISSATED GLANDS
OD_BLEPHARITIS: RLL T 1+

## 2024-10-15 ASSESSMENT — KERATOMETRY
OS_K2POWER_DIOPTERS: 43.75
OD_K2POWER_DIOPTERS: 45.50
OS_AXISANGLE_DEGREES: 082
OS_K1POWER_DIOPTERS: 43.00
OD_K1POWER_DIOPTERS: 42.50
OD_AXISANGLE_DEGREES: 123

## 2024-10-15 ASSESSMENT — CORNEAL EDEMA CLINICAL DESCRIPTION: OD_CORNEALEDEMA: ABSENT

## 2024-10-15 ASSESSMENT — REFRACTION_AUTOREFRACTION
OS_SPHERE: +0.75
OD_SPHERE: +1.00
OD_AXIS: 041
OS_CYLINDER: -0.50
OD_CYLINDER: -3.00
OS_AXIS: 108

## 2024-10-15 ASSESSMENT — CONFRONTATIONAL VISUAL FIELD TEST (CVF)
OD_FINDINGS: FULL
OS_FINDINGS: FULL

## 2024-10-15 ASSESSMENT — VISUAL ACUITY
OD_BCVA: 20/20-
OS_BCVA: 20/30-2

## 2024-10-15 ASSESSMENT — PUNCTAL DISCHARGE: OD_PUNCTAL_DISCHARGE: ABSENT

## 2024-10-15 ASSESSMENT — CORNEAL SURGICAL SCARRING: OD_SCARRING: MID PERIPHERAL

## 2024-10-15 ASSESSMENT — CORNEAL DYSTROPHY - POSTERIOR: OD_POSTERIORDYSTROPHY: 2+ GUTTATA

## 2024-11-14 ENCOUNTER — RX RENEWAL (OUTPATIENT)
Age: 59
End: 2024-11-14

## 2025-01-16 ENCOUNTER — RX ONLY (RX ONLY)
Age: 60
End: 2025-01-16

## 2025-01-16 ENCOUNTER — OFFICE (OUTPATIENT)
Dept: URBAN - METROPOLITAN AREA CLINIC 70 | Facility: CLINIC | Age: 60
Setting detail: OPHTHALMOLOGY
End: 2025-01-16
Payer: COMMERCIAL

## 2025-01-16 DIAGNOSIS — H01.002: ICD-10-CM

## 2025-01-16 DIAGNOSIS — H16.221: ICD-10-CM

## 2025-01-16 DIAGNOSIS — H43.393: ICD-10-CM

## 2025-01-16 DIAGNOSIS — H35.013: ICD-10-CM

## 2025-01-16 DIAGNOSIS — H17.9: ICD-10-CM

## 2025-01-16 DIAGNOSIS — H01.005: ICD-10-CM

## 2025-01-16 DIAGNOSIS — H25.13: ICD-10-CM

## 2025-01-16 DIAGNOSIS — B00.52: ICD-10-CM

## 2025-01-16 PROCEDURE — 92014 COMPRE OPH EXAM EST PT 1/>: CPT | Performed by: OPHTHALMOLOGY

## 2025-01-16 ASSESSMENT — KERATOMETRY
OD_K1POWER_DIOPTERS: 42.25
OS_K2POWER_DIOPTERS: 43.25
OS_AXISANGLE_DEGREES: 070
OS_K1POWER_DIOPTERS: 43.00
OD_K2POWER_DIOPTERS: 45.00
OD_AXISANGLE_DEGREES: 129

## 2025-01-16 ASSESSMENT — REFRACTION_AUTOREFRACTION
OD_AXIS: 050
OS_SPHERE: +0.25
OD_SPHERE: +1.00
OD_CYLINDER: -3.00
OS_CYLINDER: -0.75
OS_AXIS: 094

## 2025-01-16 ASSESSMENT — VISUAL ACUITY
OS_BCVA: 20/30
OD_BCVA: 20/20

## 2025-01-16 ASSESSMENT — CORNEAL DYSTROPHY - POSTERIOR: OD_POSTERIORDYSTROPHY: 2+ GUTTATA

## 2025-01-16 ASSESSMENT — SUPERFICIAL PUNCTATE KERATITIS (SPK): OD_SPK: 3+

## 2025-01-16 ASSESSMENT — CORNEAL SURGICAL SCARRING: OD_SCARRING: MID PERIPHERAL

## 2025-01-16 ASSESSMENT — CORNEAL EDEMA CLINICAL DESCRIPTION: OD_CORNEALEDEMA: ABSENT

## 2025-01-16 ASSESSMENT — CONFRONTATIONAL VISUAL FIELD TEST (CVF)
OS_FINDINGS: FULL
OD_FINDINGS: FULL

## 2025-01-16 ASSESSMENT — PUNCTAL DISCHARGE: OD_PUNCTAL_DISCHARGE: ABSENT

## 2025-04-23 ENCOUNTER — OFFICE (OUTPATIENT)
Dept: URBAN - METROPOLITAN AREA CLINIC 70 | Facility: CLINIC | Age: 60
Setting detail: OPHTHALMOLOGY
End: 2025-04-23
Payer: COMMERCIAL

## 2025-04-23 DIAGNOSIS — H00.11: ICD-10-CM

## 2025-04-23 PROCEDURE — 99213 OFFICE O/P EST LOW 20 MIN: CPT | Performed by: OPHTHALMOLOGY

## 2025-04-23 ASSESSMENT — REFRACTION_AUTOREFRACTION
OD_CYLINDER: -3.00
OS_CYLINDER: -0.75
OS_SPHERE: +0.25
OD_AXIS: 050
OS_AXIS: 094
OD_SPHERE: +1.00

## 2025-04-23 ASSESSMENT — KERATOMETRY
OD_K1POWER_DIOPTERS: 42.25
OD_AXISANGLE_DEGREES: 129
OS_AXISANGLE_DEGREES: 070
OD_K2POWER_DIOPTERS: 45.00
OS_K1POWER_DIOPTERS: 43.00
OS_K2POWER_DIOPTERS: 43.25

## 2025-04-23 ASSESSMENT — CORNEAL DYSTROPHY - POSTERIOR: OD_POSTERIORDYSTROPHY: 2+ GUTTATA

## 2025-04-23 ASSESSMENT — CORNEAL EDEMA CLINICAL DESCRIPTION: OD_CORNEALEDEMA: ABSENT

## 2025-04-23 ASSESSMENT — CONFRONTATIONAL VISUAL FIELD TEST (CVF)
OS_FINDINGS: FULL
OD_FINDINGS: FULL

## 2025-04-23 ASSESSMENT — CORNEAL SURGICAL SCARRING: OD_SCARRING: MID PERIPHERAL

## 2025-04-23 ASSESSMENT — VISUAL ACUITY
OD_BCVA: 20/20-2
OS_BCVA: 20/20

## 2025-04-23 ASSESSMENT — SUPERFICIAL PUNCTATE KERATITIS (SPK): OD_SPK: 3+

## 2025-04-23 ASSESSMENT — PUNCTAL DISCHARGE: OD_PUNCTAL_DISCHARGE: ABSENT

## 2025-05-07 ENCOUNTER — OFFICE (OUTPATIENT)
Dept: URBAN - METROPOLITAN AREA CLINIC 70 | Facility: CLINIC | Age: 60
Setting detail: OPHTHALMOLOGY
End: 2025-05-07
Payer: COMMERCIAL

## 2025-05-07 DIAGNOSIS — H43.393: ICD-10-CM

## 2025-05-07 DIAGNOSIS — H01.002: ICD-10-CM

## 2025-05-07 DIAGNOSIS — H01.005: ICD-10-CM

## 2025-05-07 DIAGNOSIS — D48.5: ICD-10-CM

## 2025-05-07 DIAGNOSIS — H00.11: ICD-10-CM

## 2025-05-07 PROCEDURE — 99213 OFFICE O/P EST LOW 20 MIN: CPT | Performed by: OPHTHALMOLOGY

## 2025-05-07 ASSESSMENT — LID EXAM ASSESSMENTS
OD_BLEPHARITIS: RLL T 1+
OS_BLEPHARITIS: LLL 1+

## 2025-05-07 ASSESSMENT — VISUAL ACUITY
OD_BCVA: 20/20-1
OS_BCVA: 20/30

## 2025-05-07 ASSESSMENT — CONFRONTATIONAL VISUAL FIELD TEST (CVF)
OD_FINDINGS: FULL
OS_FINDINGS: FULL

## 2025-05-07 ASSESSMENT — REFRACTION_AUTOREFRACTION
OD_SPHERE: +1.00
OD_CYLINDER: -3.00
OS_SPHERE: +0.25
OS_CYLINDER: -0.75
OD_AXIS: 050
OS_AXIS: 094

## 2025-05-07 ASSESSMENT — KERATOMETRY
OD_K1POWER_DIOPTERS: 42.25
OS_K2POWER_DIOPTERS: 43.25
OS_K1POWER_DIOPTERS: 43.00
OD_AXISANGLE_DEGREES: 129
OS_AXISANGLE_DEGREES: 070
OD_K2POWER_DIOPTERS: 45.00

## 2025-05-07 ASSESSMENT — CORNEAL DYSTROPHY - POSTERIOR: OD_POSTERIORDYSTROPHY: 2+ GUTTATA

## 2025-05-07 ASSESSMENT — CORNEAL SURGICAL SCARRING: OD_SCARRING: MID PERIPHERAL

## 2025-05-07 ASSESSMENT — PUNCTAL DISCHARGE: OD_PUNCTAL_DISCHARGE: ABSENT

## 2025-05-07 ASSESSMENT — CORNEAL EDEMA CLINICAL DESCRIPTION: OD_CORNEALEDEMA: ABSENT

## 2025-05-07 ASSESSMENT — SUPERFICIAL PUNCTATE KERATITIS (SPK): OD_SPK: 3+

## 2025-05-12 ENCOUNTER — RX RENEWAL (OUTPATIENT)
Age: 60
End: 2025-05-12

## 2025-06-10 ENCOUNTER — APPOINTMENT (OUTPATIENT)
Dept: FAMILY MEDICINE | Facility: CLINIC | Age: 60
End: 2025-06-10
Payer: COMMERCIAL

## 2025-06-10 VITALS
WEIGHT: 168 LBS | BODY MASS INDEX: 27 KG/M2 | OXYGEN SATURATION: 97 % | DIASTOLIC BLOOD PRESSURE: 70 MMHG | HEART RATE: 60 BPM | SYSTOLIC BLOOD PRESSURE: 112 MMHG | HEIGHT: 66 IN

## 2025-06-10 PROBLEM — Z86.39 HISTORY OF VITAMIN D DEFICIENCY: Status: RESOLVED | Noted: 2023-08-14 | Resolved: 2025-06-10

## 2025-06-10 PROBLEM — B35.6 JOCK ITCH: Status: RESOLVED | Noted: 2023-08-28 | Resolved: 2025-06-10

## 2025-06-10 PROBLEM — Z23 ENCOUNTER FOR IMMUNIZATION: Status: ACTIVE | Noted: 2023-08-09 | Resolved: 2025-06-24

## 2025-06-10 PROCEDURE — 99396 PREV VISIT EST AGE 40-64: CPT

## 2025-06-18 LAB
ALBUMIN SERPL ELPH-MCNC: 4.5 G/DL
ALP BLD-CCNC: 63 U/L
ALT SERPL-CCNC: 23 U/L
ANION GAP SERPL CALC-SCNC: 13 MMOL/L
APPEARANCE: CLEAR
AST SERPL-CCNC: 25 U/L
BACTERIA: NEGATIVE /HPF
BASOPHILS # BLD AUTO: 0.03 K/UL
BASOPHILS NFR BLD AUTO: 0.5 %
BILIRUB SERPL-MCNC: 0.5 MG/DL
BILIRUBIN URINE: NEGATIVE
BLOOD URINE: NEGATIVE
BUN SERPL-MCNC: 18 MG/DL
CALCIUM SERPL-MCNC: 9.3 MG/DL
CAST: 0 /LPF
CHLORIDE SERPL-SCNC: 105 MMOL/L
CHOLEST SERPL-MCNC: 151 MG/DL
CO2 SERPL-SCNC: 22 MMOL/L
COLOR: YELLOW
CREAT SERPL-MCNC: 0.94 MG/DL
EGFRCR SERPLBLD CKD-EPI 2021: 93 ML/MIN/1.73M2
EOSINOPHIL # BLD AUTO: 0.03 K/UL
EOSINOPHIL NFR BLD AUTO: 0.5 %
EPITHELIAL CELLS: 0 /HPF
ESTIMATED AVERAGE GLUCOSE: 105 MG/DL
GLUCOSE QUALITATIVE U: NEGATIVE MG/DL
GLUCOSE SERPL-MCNC: 111 MG/DL
HBA1C MFR BLD HPLC: 5.3 %
HCT VFR BLD CALC: 49 %
HDLC SERPL-MCNC: 58 MG/DL
HGB BLD-MCNC: 15.6 G/DL
IMM GRANULOCYTES NFR BLD AUTO: 0.2 %
KETONES URINE: NEGATIVE MG/DL
LDLC SERPL-MCNC: 80 MG/DL
LEUKOCYTE ESTERASE URINE: NEGATIVE
LYMPHOCYTES # BLD AUTO: 1.76 K/UL
LYMPHOCYTES NFR BLD AUTO: 31.1 %
MAN DIFF?: NORMAL
MCHC RBC-ENTMCNC: 30.8 PG
MCHC RBC-ENTMCNC: 31.8 G/DL
MCV RBC AUTO: 96.8 FL
MICROSCOPIC-UA: NORMAL
MONOCYTES # BLD AUTO: 0.44 K/UL
MONOCYTES NFR BLD AUTO: 7.8 %
NEUTROPHILS # BLD AUTO: 3.39 K/UL
NEUTROPHILS NFR BLD AUTO: 59.9 %
NITRITE URINE: NEGATIVE
NONHDLC SERPL-MCNC: 93 MG/DL
PH URINE: 5.5
PLATELET # BLD AUTO: 255 K/UL
POTASSIUM SERPL-SCNC: 4.5 MMOL/L
PROT SERPL-MCNC: 7.1 G/DL
PROTEIN URINE: NEGATIVE MG/DL
PSA FREE FLD-MCNC: 7 %
PSA FREE SERPL-MCNC: 0.24 NG/ML
PSA SERPL-MCNC: 3.59 NG/ML
RBC # BLD: 5.06 M/UL
RBC # FLD: 12.7 %
RED BLOOD CELLS URINE: 0 /HPF
SODIUM SERPL-SCNC: 140 MMOL/L
SPECIFIC GRAVITY URINE: 1.02
TRIGL SERPL-MCNC: 61 MG/DL
TSH SERPL-ACNC: 1.64 UIU/ML
UROBILINOGEN URINE: 0.2 MG/DL
WBC # FLD AUTO: 5.66 K/UL
WHITE BLOOD CELLS URINE: 0 /HPF

## 2025-08-13 ENCOUNTER — RX RENEWAL (OUTPATIENT)
Age: 60
End: 2025-08-13